# Patient Record
Sex: MALE | Race: WHITE | URBAN - METROPOLITAN AREA
[De-identification: names, ages, dates, MRNs, and addresses within clinical notes are randomized per-mention and may not be internally consistent; named-entity substitution may affect disease eponyms.]

---

## 2022-09-01 ENCOUNTER — OFFICE VISIT (OUTPATIENT)
Dept: URBAN - METROPOLITAN AREA CLINIC 54 | Facility: CLINIC | Age: 77
End: 2022-09-01
Payer: MEDICARE

## 2022-09-01 DIAGNOSIS — H59.022 CATARACT (LENS) FRAGMENTS IN EYE FOLLOWING CATARACT SURGERY, LEFT EYE: Primary | ICD-10-CM

## 2022-09-01 DIAGNOSIS — H25.13 AGE-RELATED NUCLEAR CATARACT, BILATERAL: ICD-10-CM

## 2022-09-01 DIAGNOSIS — H43.813 VITREOUS DEGENERATION, BILATERAL: ICD-10-CM

## 2022-09-01 PROCEDURE — 99204 OFFICE O/P NEW MOD 45 MIN: CPT | Performed by: OPHTHALMOLOGY

## 2022-09-01 PROCEDURE — 92201 OPSCPY EXTND RTA DRAW UNI/BI: CPT | Performed by: OPHTHALMOLOGY

## 2022-09-01 RX ORDER — PREDNISOLONE ACETATE 10 MG/ML
1 % SUSPENSION/ DROPS OPHTHALMIC
Qty: 5 | Refills: 1 | Status: ACTIVE
Start: 2022-09-01

## 2022-09-01 RX ORDER — GENTAMICIN SULFATE 3 MG/ML
0.3 % SOLUTION/ DROPS OPHTHALMIC
Qty: 5 | Refills: 1 | Status: ACTIVE
Start: 2022-09-01

## 2022-09-01 ASSESSMENT — INTRAOCULAR PRESSURE
OD: 9
OS: 19

## 2022-09-01 NOTE — IMPRESSION/PLAN
Impression: Vitreous degeneration, bilateral: H43.813. Bilateral.

OCT: 
OD: wnl OS: wnl Color photos: retina attached OU Plan: Indirect ophthalmoscopy was performed with scleral depression and no retinal breaks or evidence of detachment were identified. The diagnosis, natural history, and prognosis of PVD were discussed at length. The signs and symptoms of retinal break/detachment including increased flashes, new-onset floaters, and development of a shadow/curtain shade in the visual field were reviewed.

## 2022-09-01 NOTE — IMPRESSION/PLAN
Impression: Cataract (lens) fragments in eye following cataract surgery, left eye: H59.022. Left. - 3 piece IOL in sulcus- appears stable
-s/p cataract surgery with Dr Thiago Rodriguez 10 days ago
-mild inflammation, IOP okay. On Combigan Plan: Examination reveals a retained piece of lens material in the vitreous cavity after cataract surgery. Given the quantity and potential for significant inflammation and elevated intraocular pressure, surgery is recommended. We discussed the risks, benefits, indications, and alternatives to vitrectomy and lensectomy. The risks include, but are not limited to infection, retinal tear or detachment, glaucoma, hemorrhage, loss of eye and blindness, chronic CME, and need for additional surgery. The patient understands that with vitrectomy, the vision can improve, but sometimes does not improve fully and sometimes the vision does not improve at all. Also, it can take months to years for the vision to improve. The patient elects to proceed with surgery. Continue ABX-Steroid combo drop 3x/day and Combigan BID until surgery PLAN: 25 g PPV, PPL OS x RLF (next week- Tuesday; 30 min)

## 2022-09-07 ENCOUNTER — POST-OPERATIVE VISIT (OUTPATIENT)
Dept: URBAN - METROPOLITAN AREA CLINIC 54 | Facility: CLINIC | Age: 77
End: 2022-09-07
Payer: MEDICARE

## 2022-09-07 DIAGNOSIS — H59.022 CATARACT (LENS) FRAGMENTS IN EYE FOLLOWING CATARACT SURGERY, LEFT EYE: Primary | ICD-10-CM

## 2022-09-07 PROCEDURE — 99024 POSTOP FOLLOW-UP VISIT: CPT | Performed by: OPHTHALMOLOGY

## 2022-09-07 ASSESSMENT — INTRAOCULAR PRESSURE
OD: 11
OS: 10

## 2022-09-07 NOTE — IMPRESSION/PLAN
Impression: S/P 25 g PPV, PPL OS x RLF OS - 1 Day. Cataract (lens) fragments in eye following cataract surgery, left eye  H59.022. Plan: No s/s of RD/infection VA/IOP acceptable Post-operative instructions and precautions Reviewed. 
Call ASAP with changes
--Continue Ocuflox QID--Continue PF QID

1 week POS

## 2022-09-15 ENCOUNTER — POST-OPERATIVE VISIT (OUTPATIENT)
Dept: URBAN - METROPOLITAN AREA CLINIC 54 | Facility: CLINIC | Age: 77
End: 2022-09-15
Payer: MEDICARE

## 2022-09-15 DIAGNOSIS — H59.022 CATARACT (LENS) FRAGMENTS IN EYE FOLLOWING CATARACT SURGERY, LEFT EYE: Primary | ICD-10-CM

## 2022-09-15 PROCEDURE — 99024 POSTOP FOLLOW-UP VISIT: CPT | Performed by: OPHTHALMOLOGY

## 2022-09-15 ASSESSMENT — INTRAOCULAR PRESSURE
OS: 11
OD: 10

## 2022-09-15 NOTE — IMPRESSION/PLAN
Impression: S/P  25 g PPV, PPL x RLF OS - 9 Days. Cataract (lens) fragments in eye following cataract surgery, left eye  H59.022. Excellent post op course   Post operative instructions reviewed - Condition is improving - Plan: Taper drops. Ok for cat sx OD when pt ready. 

RTC 1 month DFE OS OCT OS --Taper Prednisolone acetate 1% TID x 1 wk, BID x 1wk, QD x 1wk, then d/c--Discontinue Ocuflox

## 2022-10-13 ENCOUNTER — POST-OPERATIVE VISIT (OUTPATIENT)
Dept: URBAN - METROPOLITAN AREA CLINIC 54 | Facility: CLINIC | Age: 77
End: 2022-10-13
Payer: COMMERCIAL

## 2022-10-13 DIAGNOSIS — H59.022 CATARACT (LENS) FRAGMENTS IN EYE FOLLOWING CATARACT SURGERY, LEFT EYE: Primary | ICD-10-CM

## 2022-10-13 PROCEDURE — 99024 POSTOP FOLLOW-UP VISIT: CPT | Performed by: OPHTHALMOLOGY

## 2022-10-13 ASSESSMENT — INTRAOCULAR PRESSURE
OD: 9
OS: 8

## 2022-10-13 NOTE — IMPRESSION/PLAN
Impression: S/P  25 g PPV, PPL x RLF OS - 37 Days. Cataract (lens) fragments in eye following cataract surgery, left eye  H59.022. OCT:
OS: WNL Plan: Off drops. Doing well.  

RTC PRN

## 2023-06-20 ENCOUNTER — APPOINTMENT (RX ONLY)
Dept: URBAN - METROPOLITAN AREA CLINIC 159 | Facility: CLINIC | Age: 78
Setting detail: DERMATOLOGY
End: 2023-06-20

## 2023-06-20 DIAGNOSIS — L81.4 OTHER MELANIN HYPERPIGMENTATION: ICD-10-CM

## 2023-06-20 DIAGNOSIS — L57.0 ACTINIC KERATOSIS: ICD-10-CM

## 2023-06-20 DIAGNOSIS — F42.4 EXCORIATION (SKIN-PICKING) DISORDER: ICD-10-CM

## 2023-06-20 DIAGNOSIS — L82.1 OTHER SEBORRHEIC KERATOSIS: ICD-10-CM

## 2023-06-20 DIAGNOSIS — Z71.89 OTHER SPECIFIED COUNSELING: ICD-10-CM

## 2023-06-20 PROCEDURE — ? COUNSELING

## 2023-06-20 PROCEDURE — 17003 DESTRUCT PREMALG LES 2-14: CPT

## 2023-06-20 PROCEDURE — ? LIQUID NITROGEN

## 2023-06-20 PROCEDURE — 17000 DESTRUCT PREMALG LESION: CPT

## 2023-06-20 PROCEDURE — 99203 OFFICE O/P NEW LOW 30 MIN: CPT | Mod: 25

## 2023-06-20 PROCEDURE — ? PRESCRIPTION

## 2023-06-20 RX ORDER — FLUOROURACIL 5 MG/G
1 CREAM TOPICAL ONCE A DAY
Qty: 40 | Refills: 2 | Status: ERX | COMMUNITY
Start: 2023-06-20

## 2023-06-20 RX ADMIN — FLUOROURACIL 1: 5 CREAM TOPICAL at 00:00

## 2023-06-20 ASSESSMENT — LOCATION DETAILED DESCRIPTION DERM
LOCATION DETAILED: LEFT PROXIMAL DORSAL FOREARM
LOCATION DETAILED: LEFT DORSAL WRIST
LOCATION DETAILED: RIGHT SUPERIOR LATERAL UPPER BACK
LOCATION DETAILED: LEFT SUPERIOR POSTERIOR PARIETAL SCALP
LOCATION DETAILED: RIGHT PROXIMAL DORSAL FOREARM
LOCATION DETAILED: LEFT MEDIAL UPPER BACK
LOCATION DETAILED: NASAL TIP
LOCATION DETAILED: LEFT MEDIAL SUPERIOR CHEST
LOCATION DETAILED: POSTERIOR MID-PARIETAL SCALP
LOCATION DETAILED: STERNUM
LOCATION DETAILED: RIGHT VENTRAL DISTAL FOREARM

## 2023-06-20 ASSESSMENT — LOCATION SIMPLE DESCRIPTION DERM
LOCATION SIMPLE: RIGHT UPPER BACK
LOCATION SIMPLE: POSTERIOR SCALP
LOCATION SIMPLE: CHEST
LOCATION SIMPLE: RIGHT FOREARM
LOCATION SIMPLE: LEFT UPPER BACK
LOCATION SIMPLE: LEFT FOREARM
LOCATION SIMPLE: LEFT WRIST
LOCATION SIMPLE: NOSE

## 2023-06-20 ASSESSMENT — LOCATION ZONE DERM
LOCATION ZONE: SCALP
LOCATION ZONE: TRUNK
LOCATION ZONE: NOSE
LOCATION ZONE: ARM

## 2023-06-20 NOTE — PROCEDURE: LIQUID NITROGEN
Consent: The patient's consent was obtained including but not limited to risks of crusting, scabbing, blistering, scarring, darker or lighter pigmentary change, recurrence, incomplete removal and infection.
Render Post-Care Instructions In Note?: yes
Detail Level: Detailed
Number Of Freeze-Thaw Cycles: 1 freeze-thaw cycle
Duration Of Freeze Thaw-Cycle (Seconds): 5
Post-Care Instructions: I reviewed with the patient in detail post-care instructions. Patient is to wear sunprotection, and avoid picking at any of the treated lesions. Pt may apply Vaseline to crusted or scabbing areas.
Render Note In Bullet Format When Appropriate: No
Application Tool (Optional): Liquid Nitrogen Sprayer